# Patient Record
Sex: FEMALE | Race: WHITE | NOT HISPANIC OR LATINO | Employment: OTHER | URBAN - METROPOLITAN AREA
[De-identification: names, ages, dates, MRNs, and addresses within clinical notes are randomized per-mention and may not be internally consistent; named-entity substitution may affect disease eponyms.]

---

## 2021-02-26 ENCOUNTER — OFFICE VISIT (OUTPATIENT)
Dept: UROLOGY | Facility: CLINIC | Age: 56
End: 2021-02-26
Payer: COMMERCIAL

## 2021-02-26 VITALS
DIASTOLIC BLOOD PRESSURE: 80 MMHG | HEIGHT: 62 IN | WEIGHT: 149.94 LBS | SYSTOLIC BLOOD PRESSURE: 116 MMHG | BODY MASS INDEX: 27.59 KG/M2

## 2021-02-26 DIAGNOSIS — N39.3 STRESS INCONTINENCE: ICD-10-CM

## 2021-02-26 DIAGNOSIS — N39.0 RECURRENT UTI: Primary | ICD-10-CM

## 2021-02-26 LAB
BILIRUB SERPL-MCNC: NORMAL MG/DL
BLOOD URINE, POC: NORMAL
CLARITY, POC UA: CLEAR
COLOR, POC UA: YELLOW
GLUCOSE UR QL STRIP: NORMAL
KETONES UR QL STRIP: NORMAL
LEUKOCYTE ESTERASE URINE, POC: NORMAL
NITRITE, POC UA: NORMAL
PH, POC UA: 5
PROTEIN, POC: NORMAL
SPECIFIC GRAVITY, POC UA: 1.02
UROBILINOGEN, POC UA: NORMAL

## 2021-02-26 PROCEDURE — 99213 OFFICE O/P EST LOW 20 MIN: CPT | Mod: 25,S$GLB,, | Performed by: UROLOGY

## 2021-02-26 PROCEDURE — 81002 POCT URINE DIPSTICK WITHOUT MICROSCOPE: ICD-10-PCS | Mod: S$GLB,,, | Performed by: UROLOGY

## 2021-02-26 PROCEDURE — 1126F AMNT PAIN NOTED NONE PRSNT: CPT | Mod: S$GLB,,, | Performed by: UROLOGY

## 2021-02-26 PROCEDURE — 3008F PR BODY MASS INDEX (BMI) DOCUMENTED: ICD-10-PCS | Mod: CPTII,S$GLB,, | Performed by: UROLOGY

## 2021-02-26 PROCEDURE — 99213 PR OFFICE/OUTPT VISIT, EST, LEVL III, 20-29 MIN: ICD-10-PCS | Mod: 25,S$GLB,, | Performed by: UROLOGY

## 2021-02-26 PROCEDURE — 81002 URINALYSIS NONAUTO W/O SCOPE: CPT | Mod: S$GLB,,, | Performed by: UROLOGY

## 2021-02-26 PROCEDURE — 99999 PR PBB SHADOW E&M-NEW PATIENT-LVL III: CPT | Mod: PBBFAC,,, | Performed by: UROLOGY

## 2021-02-26 PROCEDURE — 1126F PR PAIN SEVERITY QUANTIFIED, NO PAIN PRESENT: ICD-10-PCS | Mod: S$GLB,,, | Performed by: UROLOGY

## 2021-02-26 PROCEDURE — 99999 PR PBB SHADOW E&M-NEW PATIENT-LVL III: ICD-10-PCS | Mod: PBBFAC,,, | Performed by: UROLOGY

## 2021-02-26 PROCEDURE — 3008F BODY MASS INDEX DOCD: CPT | Mod: CPTII,S$GLB,, | Performed by: UROLOGY

## 2021-02-26 RX ORDER — NITROFURANTOIN MACROCRYSTALS 50 MG/1
CAPSULE ORAL
Qty: 30 CAPSULE | Refills: 4 | Status: SHIPPED | OUTPATIENT
Start: 2021-02-26 | End: 2021-12-21

## 2021-02-26 RX ORDER — CLINDAMYCIN PHOSPHATE 10 UG/ML
LOTION TOPICAL
COMMUNITY
Start: 2020-07-09

## 2021-02-26 RX ORDER — SUMATRIPTAN SUCCINATE 100 MG/1
TABLET ORAL
COMMUNITY
Start: 2020-10-12

## 2021-02-26 RX ORDER — ESTRADIOL 0.07 MG/D
1 FILM, EXTENDED RELEASE TRANSDERMAL
COMMUNITY
End: 2022-01-06 | Stop reason: SDUPTHER

## 2021-02-26 RX ORDER — ACETAMINOPHEN 500 MG
1 TABLET ORAL
COMMUNITY

## 2021-02-26 RX ORDER — OLMESARTAN MEDOXOMIL 20 MG/1
TABLET ORAL
COMMUNITY

## 2021-02-26 RX ORDER — BUSPIRONE HYDROCHLORIDE 10 MG/1
10 TABLET ORAL
COMMUNITY
Start: 2020-07-27

## 2021-02-26 RX ORDER — MAGNESIUM 200 MG
TABLET ORAL
COMMUNITY

## 2021-02-26 RX ORDER — NITROFURANTOIN MACROCRYSTALS 50 MG/1
50 CAPSULE ORAL
COMMUNITY
End: 2021-02-26 | Stop reason: SDUPTHER

## 2021-02-26 RX ORDER — MULTIVITAMIN
1 TABLET ORAL
COMMUNITY

## 2022-02-10 ENCOUNTER — OFFICE VISIT (OUTPATIENT)
Dept: UROLOGY | Facility: CLINIC | Age: 57
End: 2022-02-10
Payer: COMMERCIAL

## 2022-02-10 VITALS
WEIGHT: 157.44 LBS | SYSTOLIC BLOOD PRESSURE: 110 MMHG | BODY MASS INDEX: 28.79 KG/M2 | DIASTOLIC BLOOD PRESSURE: 80 MMHG

## 2022-02-10 DIAGNOSIS — N39.0 RECURRENT UTI: Primary | ICD-10-CM

## 2022-02-10 DIAGNOSIS — N39.3 STRESS INCONTINENCE: ICD-10-CM

## 2022-02-10 DIAGNOSIS — N28.1 RENAL CYST: ICD-10-CM

## 2022-02-10 PROCEDURE — 3008F BODY MASS INDEX DOCD: CPT | Mod: CPTII,S$GLB,, | Performed by: UROLOGY

## 2022-02-10 PROCEDURE — 1159F PR MEDICATION LIST DOCUMENTED IN MEDICAL RECORD: ICD-10-PCS | Mod: CPTII,S$GLB,, | Performed by: UROLOGY

## 2022-02-10 PROCEDURE — 1159F MED LIST DOCD IN RCRD: CPT | Mod: CPTII,S$GLB,, | Performed by: UROLOGY

## 2022-02-10 PROCEDURE — 99213 PR OFFICE/OUTPT VISIT, EST, LEVL III, 20-29 MIN: ICD-10-PCS | Mod: S$GLB,,, | Performed by: UROLOGY

## 2022-02-10 PROCEDURE — 3008F PR BODY MASS INDEX (BMI) DOCUMENTED: ICD-10-PCS | Mod: CPTII,S$GLB,, | Performed by: UROLOGY

## 2022-02-10 PROCEDURE — 99999 PR PBB SHADOW E&M-EST. PATIENT-LVL III: CPT | Mod: PBBFAC,,, | Performed by: UROLOGY

## 2022-02-10 PROCEDURE — 99999 PR PBB SHADOW E&M-EST. PATIENT-LVL III: ICD-10-PCS | Mod: PBBFAC,,, | Performed by: UROLOGY

## 2022-02-10 PROCEDURE — 99213 OFFICE O/P EST LOW 20 MIN: CPT | Mod: S$GLB,,, | Performed by: UROLOGY

## 2022-02-10 PROCEDURE — 3079F PR MOST RECENT DIASTOLIC BLOOD PRESSURE 80-89 MM HG: ICD-10-PCS | Mod: CPTII,S$GLB,, | Performed by: UROLOGY

## 2022-02-10 PROCEDURE — 3074F SYST BP LT 130 MM HG: CPT | Mod: CPTII,S$GLB,, | Performed by: UROLOGY

## 2022-02-10 PROCEDURE — 3074F PR MOST RECENT SYSTOLIC BLOOD PRESSURE < 130 MM HG: ICD-10-PCS | Mod: CPTII,S$GLB,, | Performed by: UROLOGY

## 2022-02-10 PROCEDURE — 3079F DIAST BP 80-89 MM HG: CPT | Mod: CPTII,S$GLB,, | Performed by: UROLOGY

## 2022-02-10 RX ORDER — EZETIMIBE 10 MG/1
10 TABLET ORAL
COMMUNITY
Start: 2022-01-21 | End: 2023-01-09

## 2022-02-10 RX ORDER — NITROFURANTOIN MACROCRYSTALS 50 MG/1
CAPSULE ORAL
Qty: 30 CAPSULE | Refills: 11 | Status: SHIPPED | OUTPATIENT
Start: 2022-02-10 | End: 2023-02-08 | Stop reason: SDUPTHER

## 2022-02-10 NOTE — PROGRESS NOTES
Chief Complaint   Patient presents with    Other     1 yr f/u          History of Present Illness:   Sobeida Castanon is a 56 y.o. female here for follow up.     2/10/22-No recent UTIs as long as she takes macrobid. No gross hematuria. JC with cough/sneze, but it doesn't happen if she squeezes her legs. Hasn't been doing kegels.   2/26/21-She has a h/o recurrent UTI and renal cyst. She uses post-coital macrobid. Last year, her renal ultrasound was unremarkable and did not show any cyst.   She has recently developed worsening of JC with cough, sneeze, laughing. Wears a pad when she goes out only. No recent UTI. No dysuria or gross hematuria.         Review of Systems   Constitutional: Negative for chills and fever.   Cardiovascular: Negative for chest pain.   Gastrointestinal: Negative for abdominal pain.   Musculoskeletal: Negative for back pain.   All other systems reviewed and are negative.      Current Outpatient Medications   Medication Sig Dispense Refill    busPIRone (BUSPAR) 10 MG tablet Take 10 mg by mouth.      cholecalciferol, vitamin D3, (VITAMIN D3) 50 mcg (2,000 unit) Cap Take 1 capsule by mouth.      clindamycin (CLEOCIN T) 1 % lotion APPLY TO AFFECTED AREA(S) TWICE DAILY      denosumab (PROLIA SUBQ) Prolia Take No date recorded No form recorded No frequency recorded No route recorded No set duration recorded No set duration amount recorded suspended No dosage strength recorded No dosage strength units of measure recorded      estradioL (VIVELLE-DOT) 0.075 mg/24 hr Place 1 patch onto the skin twice a week. 8 patch 11    ezetimibe (ZETIA) 10 mg tablet Take 10 mg by mouth.      magnesium 200 mg Tab magnesium Take No date recorded No form recorded No frequency recorded No route recorded No set duration recorded No set duration amount recorded active No dosage strength recorded No dosage strength units of measure recorded      multivitamin (THERAGRAN) per tablet Take 1 tablet by mouth.       olmesartan (BENICAR) 20 MG tablet       sumatriptan (IMITREX) 100 MG tablet TAKE ONE TABLET BY MOUTH AT ONSET OF HEADACHE; MAY REPEAT ONE TABLET IN 2 HOURS IF NEEDED.      nitrofurantoin (MACRODANTIN) 50 MG capsule TAKE 1 BY MOUTH POST-COITAL 30 capsule 11     No current facility-administered medications for this visit.       Review of patient's allergies indicates:   Allergen Reactions    Bisphosphonates      epig pain    Levofloxacin Other (See Comments)     Other reaction(s): Numbness, Other (See Comments)  Numbness to one side, pt states she felt like she was having a stroke  Felt like was having a stroke  Numbness to one side, pt states she felt like she was having a stroke      Atorvastatin     Rosuvastatin      Cannot recall SE's       Past medical, family, and social history reviewed as documented in chart with pertinent positive medical, family, and social history detailed in HPI.    Physical Exam  Vitals:    02/10/22 0946   BP: 110/80       General: Well-developed, Well Nourished in No acute distress  HEENT: Normocephalic, Atraumatic, Extraocular Movements intact  Neck: Supple, trachea midline, no cervical or supraclavicular lymphadenopathy  Respirations: Even and unlabored  Back: Midline spine without tenderness, no CVA tenderness  Abdomen: Soft, Non-tender, non-distended, no hepatosplenomegaly, no rebound or guarding, no palpable masses  Extremities: Moves all equally, atraumatic, no clubbing, cyanosis or edema  Skin: warm and dry  Psych: normal affect  Neuro: Alert and oriented x 3, Cranial nerves II-XII grossly intact      Urinalysis  Negative for blood, LE, nitrite    Assesment:   1. Recurrent UTI  POCT URINE DIPSTICK WITHOUT MICROSCOPE   2. Renal cyst  US Retroperitoneal Complete (Kidney and   3. Stress incontinence           Plan:  Recurrent UTI  -     POCT URINE DIPSTICK WITHOUT MICROSCOPE    Renal cyst  -     US Retroperitoneal Complete (Kidney and; Future; Expected date:  02/10/2022    Stress incontinence    Other orders  -     nitrofurantoin (MACRODANTIN) 50 MG capsule; TAKE 1 BY MOUTH POST-COITAL  Dispense: 30 capsule; Refill: 11    kegels    Follow up in about 1 year (around 2/10/2023).

## 2022-02-15 ENCOUNTER — HOSPITAL ENCOUNTER (OUTPATIENT)
Dept: RADIOLOGY | Facility: HOSPITAL | Age: 57
Discharge: HOME | End: 2022-02-15
Attending: UROLOGY
Payer: COMMERCIAL

## 2022-02-15 DIAGNOSIS — N28.1 RENAL CYST: ICD-10-CM

## 2022-02-15 PROCEDURE — 76770 US EXAM ABDO BACK WALL COMP: CPT | Mod: 26,,, | Performed by: RADIOLOGY

## 2022-02-15 PROCEDURE — 76770 US RETROPERITONEAL COMPLETE: ICD-10-PCS | Mod: 26,,, | Performed by: RADIOLOGY

## 2022-02-15 PROCEDURE — 76770 US EXAM ABDO BACK WALL COMP: CPT | Mod: TC

## 2023-02-08 ENCOUNTER — OFFICE VISIT (OUTPATIENT)
Dept: UROLOGY | Facility: CLINIC | Age: 58
End: 2023-02-08
Payer: COMMERCIAL

## 2023-02-08 VITALS
WEIGHT: 155.44 LBS | DIASTOLIC BLOOD PRESSURE: 75 MMHG | SYSTOLIC BLOOD PRESSURE: 107 MMHG | BODY MASS INDEX: 28.43 KG/M2 | HEART RATE: 78 BPM

## 2023-02-08 DIAGNOSIS — N28.1 RENAL CYST: ICD-10-CM

## 2023-02-08 DIAGNOSIS — N39.0 RECURRENT UTI: Primary | ICD-10-CM

## 2023-02-08 DIAGNOSIS — N39.3 STRESS INCONTINENCE: ICD-10-CM

## 2023-02-08 PROCEDURE — 99999 PR PBB SHADOW E&M-EST. PATIENT-LVL IV: CPT | Mod: PBBFAC,,, | Performed by: UROLOGY

## 2023-02-08 PROCEDURE — 3074F SYST BP LT 130 MM HG: CPT | Mod: CPTII,S$GLB,, | Performed by: UROLOGY

## 2023-02-08 PROCEDURE — 3078F DIAST BP <80 MM HG: CPT | Mod: CPTII,S$GLB,, | Performed by: UROLOGY

## 2023-02-08 PROCEDURE — 3078F PR MOST RECENT DIASTOLIC BLOOD PRESSURE < 80 MM HG: ICD-10-PCS | Mod: CPTII,S$GLB,, | Performed by: UROLOGY

## 2023-02-08 PROCEDURE — 3008F PR BODY MASS INDEX (BMI) DOCUMENTED: ICD-10-PCS | Mod: CPTII,S$GLB,, | Performed by: UROLOGY

## 2023-02-08 PROCEDURE — 1159F PR MEDICATION LIST DOCUMENTED IN MEDICAL RECORD: ICD-10-PCS | Mod: CPTII,S$GLB,, | Performed by: UROLOGY

## 2023-02-08 PROCEDURE — 3074F PR MOST RECENT SYSTOLIC BLOOD PRESSURE < 130 MM HG: ICD-10-PCS | Mod: CPTII,S$GLB,, | Performed by: UROLOGY

## 2023-02-08 PROCEDURE — 99214 PR OFFICE/OUTPT VISIT, EST, LEVL IV, 30-39 MIN: ICD-10-PCS | Mod: S$GLB,,, | Performed by: UROLOGY

## 2023-02-08 PROCEDURE — 3008F BODY MASS INDEX DOCD: CPT | Mod: CPTII,S$GLB,, | Performed by: UROLOGY

## 2023-02-08 PROCEDURE — 1159F MED LIST DOCD IN RCRD: CPT | Mod: CPTII,S$GLB,, | Performed by: UROLOGY

## 2023-02-08 PROCEDURE — 99999 PR PBB SHADOW E&M-EST. PATIENT-LVL IV: ICD-10-PCS | Mod: PBBFAC,,, | Performed by: UROLOGY

## 2023-02-08 PROCEDURE — 99214 OFFICE O/P EST MOD 30 MIN: CPT | Mod: S$GLB,,, | Performed by: UROLOGY

## 2023-02-08 RX ORDER — NITROFURANTOIN MACROCRYSTALS 50 MG/1
CAPSULE ORAL
Qty: 30 CAPSULE | Refills: 11 | Status: SHIPPED | OUTPATIENT
Start: 2023-02-08 | End: 2024-02-09 | Stop reason: SDUPTHER

## 2023-02-08 NOTE — PROGRESS NOTES
Chief Complaint   Patient presents with    Follow-up         History of Present Illness:   Sobeida Castanon is a 57 y.o. female here for follow up.     2/8/23- on post-coital macrobid and it works well as long as she takes it. If she forgets, she develops dysuria the next day. JC is getting worse. Wears 1 pad per day if she goes out. Didn't do kegels.   2/10/22-No recent UTIs as long as she takes macrobid. No gross hematuria. JC with cough/sneze, but it doesn't happen if she squeezes her legs. Hasn't been doing kegels.   2/26/21-She has a h/o recurrent UTI and renal cyst. She uses post-coital macrobid. Last year, her renal ultrasound was unremarkable and did not show any cyst.   She has recently developed worsening of JC with cough, sneeze, laughing. Wears a pad when she goes out only. No recent UTI. No dysuria or gross hematuria.         Review of Systems   Constitutional:  Negative for chills and fever.   Cardiovascular:  Negative for chest pain.   Gastrointestinal:  Negative for abdominal pain.   Musculoskeletal:  Negative for back pain.   All other systems reviewed and are negative.    Current Outpatient Medications   Medication Sig Dispense Refill    buPROPion (WELLBUTRIN XL) 150 MG TB24 tablet Take 1 tablet by mouth once daily.      busPIRone (BUSPAR) 10 MG tablet Take 10 mg by mouth.      cholecalciferol, vitamin D3, (VITAMIN D3) 50 mcg (2,000 unit) Cap Take 1 capsule by mouth.      clindamycin (CLEOCIN T) 1 % lotion APPLY TO AFFECTED AREA(S) TWICE DAILY      estradioL (VIVELLE-DOT) 0.075 mg/24 hr Place 1 patch onto the skin twice a week. 24 patch 3    magnesium 200 mg Tab magnesium Take No date recorded No form recorded No frequency recorded No route recorded No set duration recorded No set duration amount recorded active No dosage strength recorded No dosage strength units of measure recorded      multivitamin (THERAGRAN) per tablet Take 1 tablet by mouth.      olmesartan (BENICAR) 20 MG tablet        sumatriptan (IMITREX) 100 MG tablet TAKE ONE TABLET BY MOUTH AT ONSET OF HEADACHE; MAY REPEAT ONE TABLET IN 2 HOURS IF NEEDED.      denosumab (PROLIA SUBQ) Prolia Take No date recorded No form recorded No frequency recorded No route recorded No set duration recorded No set duration amount recorded suspended No dosage strength recorded No dosage strength units of measure recorded      nitrofurantoin (MACRODANTIN) 50 MG capsule TAKE 1 BY MOUTH POST-COITAL 30 capsule 11     No current facility-administered medications for this visit.       Review of patient's allergies indicates:   Allergen Reactions    Bisphosphonates      epig pain    Levofloxacin Other (See Comments)     Other reaction(s): Numbness, Other (See Comments)  Numbness to one side, pt states she felt like she was having a stroke  Felt like was having a stroke  Numbness to one side, pt states she felt like she was having a stroke      Atorvastatin     Ezetimibe      HA two weeks    Rosuvastatin      Cannot recall SE's       Past medical, family, and social history reviewed as documented in chart with pertinent positive medical, family, and social history detailed in HPI.    Physical Exam  Vitals:    02/08/23 1047   BP: 107/75   Pulse: 78         General: Well-developed, Well Nourished in No acute distress  HEENT: Normocephalic, Atraumatic, Extraocular Movements intact  Neck: Supple, trachea midline, no cervical or supraclavicular lymphadenopathy  Respirations: Even and unlabored  Back: Midline spine without tenderness, no CVA tenderness  Abdomen: Soft, Non-tender, non-distended, no hepatosplenomegaly, no rebound or guarding, no palpable masses  Extremities: Moves all equally, atraumatic, no clubbing, cyanosis or edema  Skin: warm and dry  Psych: normal affect  Neuro: Alert and oriented x 3, Cranial nerves II-XII grossly intact      Urinalysis  Negative for blood, LE, nitrite    Assesment:   1. Recurrent UTI        2. Stress incontinence        3. Renal  cyst  US Retroperitoneal Complete              Plan:  Recurrent UTI    Stress incontinence    Renal cyst  -     US Retroperitoneal Complete; Future; Expected date: 02/08/2023    Other orders  -     nitrofurantoin (MACRODANTIN) 50 MG capsule; TAKE 1 BY MOUTH POST-COITAL  Dispense: 30 capsule; Refill: 11      Pt considering PFPT. Will let me know if she wants to pursue this. Also discussed sling surgery.     Follow up in about 1 year (around 2/8/2024).

## 2023-02-10 ENCOUNTER — HOSPITAL ENCOUNTER (OUTPATIENT)
Dept: RADIOLOGY | Facility: HOSPITAL | Age: 58
Discharge: HOME | End: 2023-02-10
Attending: UROLOGY
Payer: COMMERCIAL

## 2023-02-10 DIAGNOSIS — N28.1 RENAL CYST: ICD-10-CM

## 2023-02-10 PROCEDURE — 76770 US EXAM ABDO BACK WALL COMP: CPT | Mod: 26,,, | Performed by: RADIOLOGY

## 2023-02-10 PROCEDURE — 76770 US RETROPERITONEAL COMPLETE: ICD-10-PCS | Mod: 26,,, | Performed by: RADIOLOGY

## 2023-02-10 PROCEDURE — 76770 US EXAM ABDO BACK WALL COMP: CPT | Mod: TC,PN

## 2024-02-09 ENCOUNTER — OFFICE VISIT (OUTPATIENT)
Dept: UROLOGY | Facility: CLINIC | Age: 59
End: 2024-02-09
Payer: COMMERCIAL

## 2024-02-09 VITALS
RESPIRATION RATE: 18 BRPM | HEART RATE: 87 BPM | WEIGHT: 156.75 LBS | SYSTOLIC BLOOD PRESSURE: 127 MMHG | DIASTOLIC BLOOD PRESSURE: 84 MMHG | HEIGHT: 62 IN | BODY MASS INDEX: 28.84 KG/M2

## 2024-02-09 DIAGNOSIS — N39.0 RECURRENT UTI: Primary | ICD-10-CM

## 2024-02-09 DIAGNOSIS — N39.3 STRESS INCONTINENCE: ICD-10-CM

## 2024-02-09 LAB
BILIRUB UR QL STRIP: NEGATIVE
GLUCOSE UR QL STRIP: NEGATIVE
KETONES UR QL STRIP: NEGATIVE
LEUKOCYTE ESTERASE UR QL STRIP: NEGATIVE
PH, POC UA: 6.5
POC BLOOD, URINE: NEGATIVE
POC NITRATES, URINE: NEGATIVE
POC RESIDUAL URINE VOLUME: 77 ML (ref 0–100)
PROT UR QL STRIP: NEGATIVE
SP GR UR STRIP: 1.01 (ref 1–1.03)
UROBILINOGEN UR STRIP-ACNC: 0.2 (ref 0.1–1.1)

## 2024-02-09 PROCEDURE — 3079F DIAST BP 80-89 MM HG: CPT | Mod: CPTII,S$GLB,, | Performed by: UROLOGY

## 2024-02-09 PROCEDURE — 3074F SYST BP LT 130 MM HG: CPT | Mod: CPTII,S$GLB,, | Performed by: UROLOGY

## 2024-02-09 PROCEDURE — 81003 URINALYSIS AUTO W/O SCOPE: CPT | Mod: QW,S$GLB,, | Performed by: UROLOGY

## 2024-02-09 PROCEDURE — 99214 OFFICE O/P EST MOD 30 MIN: CPT | Mod: S$GLB,,, | Performed by: UROLOGY

## 2024-02-09 PROCEDURE — 99999 PR PBB SHADOW E&M-EST. PATIENT-LVL IV: CPT | Mod: PBBFAC,,, | Performed by: UROLOGY

## 2024-02-09 PROCEDURE — 3008F BODY MASS INDEX DOCD: CPT | Mod: CPTII,S$GLB,, | Performed by: UROLOGY

## 2024-02-09 PROCEDURE — 1159F MED LIST DOCD IN RCRD: CPT | Mod: CPTII,S$GLB,, | Performed by: UROLOGY

## 2024-02-09 PROCEDURE — 51798 US URINE CAPACITY MEASURE: CPT | Mod: S$GLB,,, | Performed by: UROLOGY

## 2024-02-09 RX ORDER — NITROFURANTOIN MACROCRYSTALS 50 MG/1
CAPSULE ORAL
Qty: 30 CAPSULE | Refills: 11 | Status: SHIPPED | OUTPATIENT
Start: 2024-02-09

## 2024-02-09 NOTE — PROGRESS NOTES
Chief Complaint   Patient presents with    Annual Exam         History of Present Illness:   Sobeida Castanon is a 58 y.o. female here for follow up.     2/9/24-Pt on post-coital macrobid. Works well. No dysuria or gross hematuria. No recent UTIs. She does have some JC. Does kegels when she thinks about it. No pads.   2/8/23- on post-coital macrobid and it works well as long as she takes it. If she forgets, she develops dysuria the next day. JC is getting worse. Wears 1 pad per day if she goes out. Didn't do kegels.   2/10/22-No recent UTIs as long as she takes macrobid. No gross hematuria. JC with cough/sneze, but it doesn't happen if she squeezes her legs. Hasn't been doing kegels.   2/26/21-She has a h/o recurrent UTI and renal cyst. She uses post-coital macrobid. Last year, her renal ultrasound was unremarkable and did not show any cyst.   She has recently developed worsening of JC with cough, sneeze, laughing. Wears a pad when she goes out only. No recent UTI. No dysuria or gross hematuria.         Review of Systems   Constitutional:  Negative for chills and fever.   Cardiovascular:  Negative for chest pain.   Gastrointestinal:  Negative for abdominal pain.   Musculoskeletal:  Negative for back pain.   All other systems reviewed and are negative.      Current Outpatient Medications   Medication Sig Dispense Refill    bempedoic acid 180 mg Tab Take 1 tablet by mouth every morning.      buPROPion (WELLBUTRIN XL) 150 MG TB24 tablet Take 1 tablet by mouth once daily.      busPIRone (BUSPAR) 10 MG tablet Take 10 mg by mouth.      cholecalciferol, vitamin D3, (VITAMIN D3) 50 mcg (2,000 unit) Cap Take 1 capsule by mouth.      clindamycin (CLEOCIN T) 1 % lotion APPLY TO AFFECTED AREA(S) TWICE DAILY      DRYSOL DAB-O-MATIC 20 % external solution       estradioL (VIVELLE-DOT) 0.075 mg/24 hr Place 1 patch onto the skin twice a week. 24 patch 3    magnesium 200 mg Tab magnesium Take No date recorded No form recorded  No frequency recorded No route recorded No set duration recorded No set duration amount recorded active No dosage strength recorded No dosage strength units of measure recorded      multivitamin (THERAGRAN) per tablet Take 1 tablet by mouth.      olmesartan (BENICAR) 20 MG tablet       pantoprazole (PROTONIX) 40 MG tablet 30 tablets.      sumatriptan (IMITREX) 100 MG tablet TAKE ONE TABLET BY MOUTH AT ONSET OF HEADACHE; MAY REPEAT ONE TABLET IN 2 HOURS IF NEEDED.      azithromycin (Z-ADELAIDA) 250 MG tablet Take 2 tablets by mouth on day 1; Take 1 tablet by mouth on days 2-5 6 tablet 0    denosumab (PROLIA SUBQ) Prolia Take No date recorded No form recorded No frequency recorded No route recorded No set duration recorded No set duration amount recorded suspended No dosage strength recorded No dosage strength units of measure recorded      nitrofurantoin (MACRODANTIN) 50 MG capsule TAKE 1 BY MOUTH POST-COITAL 30 capsule 11    promethazine (PHENERGAN) 25 MG tablet TAKE 1 TABLET BY MOUTH EVERY 4-6 HOURS AS NEEDED FOR NAUSEA      sucralfate (CARAFATE) 1 gram tablet Take 1 g by mouth 4 (four) times daily.       No current facility-administered medications for this visit.       Review of patient's allergies indicates:   Allergen Reactions    Bisphosphonates      epig pain    Levofloxacin Other (See Comments)     Other reaction(s): Numbness, Other (See Comments)  Numbness to one side, pt states she felt like she was having a stroke  Felt like was having a stroke  Numbness to one side, pt states she felt like she was having a stroke      Atorvastatin     Ezetimibe      HA two weeks    Rosuvastatin      Cannot recall SE's       Past medical, family, and social history reviewed as documented in chart with pertinent positive medical, family, and social history detailed in HPI.    Physical Exam  Vitals:    02/09/24 1059   BP: 127/84   Pulse: 87   Resp: 18           General: Well-developed, Well Nourished in No acute distress  HEENT:  Normocephalic, Atraumatic, Extraocular Movements intact  Neck: Supple, trachea midline, no cervical or supraclavicular lymphadenopathy  Respirations: Even and unlabored  Back: Midline spine without tenderness, no CVA tenderness  Abdomen: Soft, Non-tender, non-distended, no hepatosplenomegaly, no rebound or guarding, no palpable masses  Extremities: Moves all equally, atraumatic, no clubbing, cyanosis or edema  Skin: warm and dry  Psych: normal affect  Neuro: Alert and oriented x 3, Cranial nerves II-XII grossly intact      Urinalysis  Negative for blood, LE, nitrite    Assesment:   1. Recurrent UTI  POCT Urinalysis, Dipstick, Automated, W/O Scope    POCT Bladder Scan      2. Stress incontinence                  Plan:  Recurrent UTI  -     POCT Urinalysis, Dipstick, Automated, W/O Scope  -     POCT Bladder Scan    Stress incontinence    Other orders  -     nitrofurantoin (MACRODANTIN) 50 MG capsule; TAKE 1 BY MOUTH POST-COITAL  Dispense: 30 capsule; Refill: 11      Discussed kegels, pelvic floor PT, mid-urethral sling and urethral bulking injections. Pt to continue kegels.       Follow up in about 1 year (around 2/9/2025).

## 2025-01-24 ENCOUNTER — OFFICE VISIT (OUTPATIENT)
Dept: UROLOGY | Facility: CLINIC | Age: 60
End: 2025-01-24
Payer: COMMERCIAL

## 2025-01-24 ENCOUNTER — HOSPITAL ENCOUNTER (OUTPATIENT)
Dept: RADIOLOGY | Facility: HOSPITAL | Age: 60
Discharge: HOME | End: 2025-01-24
Attending: UROLOGY
Payer: COMMERCIAL

## 2025-01-24 VITALS
DIASTOLIC BLOOD PRESSURE: 73 MMHG | HEART RATE: 80 BPM | RESPIRATION RATE: 18 BRPM | SYSTOLIC BLOOD PRESSURE: 108 MMHG | BODY MASS INDEX: 28.56 KG/M2 | WEIGHT: 155.19 LBS | HEIGHT: 62 IN

## 2025-01-24 DIAGNOSIS — N39.3 STRESS INCONTINENCE: ICD-10-CM

## 2025-01-24 DIAGNOSIS — R10.32 LLQ PAIN: ICD-10-CM

## 2025-01-24 DIAGNOSIS — N39.0 RECURRENT UTI: Primary | ICD-10-CM

## 2025-01-24 DIAGNOSIS — R31.29 MICROHEMATURIA: ICD-10-CM

## 2025-01-24 LAB
AMM URATE CRY UR QL COMP ASSIST: ABNORMAL
BACTERIA #/AREA URNS AUTO: ABNORMAL /HPF
BILIRUB UR QL STRIP: NEGATIVE
GLUCOSE UR QL STRIP: NEGATIVE
KETONES UR QL STRIP: NEGATIVE
LEUKOCYTE ESTERASE UR QL STRIP: NEGATIVE
MICROSCOPIC COMMENT: ABNORMAL
PH, POC UA: 5.5
POC BLOOD, URINE: POSITIVE
POC NITRATES, URINE: NEGATIVE
POC RESIDUAL URINE VOLUME: 0 ML (ref 0–100)
PROT UR QL STRIP: NEGATIVE
RBC #/AREA URNS AUTO: 1 /HPF (ref 0–4)
SP GR UR STRIP: 1.02 (ref 1–1.03)
SQUAMOUS #/AREA URNS AUTO: 7 /HPF
UROBILINOGEN UR STRIP-ACNC: 0.2 (ref 0.1–1.1)
WBC #/AREA URNS AUTO: 3 /HPF (ref 0–5)

## 2025-01-24 PROCEDURE — 3074F SYST BP LT 130 MM HG: CPT | Mod: CPTII,S$GLB,, | Performed by: UROLOGY

## 2025-01-24 PROCEDURE — 81003 URINALYSIS AUTO W/O SCOPE: CPT | Mod: QW,S$GLB,, | Performed by: UROLOGY

## 2025-01-24 PROCEDURE — 76770 US EXAM ABDO BACK WALL COMP: CPT | Mod: 26,,, | Performed by: RADIOLOGY

## 2025-01-24 PROCEDURE — 1160F RVW MEDS BY RX/DR IN RCRD: CPT | Mod: CPTII,S$GLB,, | Performed by: UROLOGY

## 2025-01-24 PROCEDURE — 99214 OFFICE O/P EST MOD 30 MIN: CPT | Mod: S$GLB,,, | Performed by: UROLOGY

## 2025-01-24 PROCEDURE — 81001 URINALYSIS AUTO W/SCOPE: CPT | Performed by: UROLOGY

## 2025-01-24 PROCEDURE — 3078F DIAST BP <80 MM HG: CPT | Mod: CPTII,S$GLB,, | Performed by: UROLOGY

## 2025-01-24 PROCEDURE — 76770 US EXAM ABDO BACK WALL COMP: CPT | Mod: TC,PN

## 2025-01-24 PROCEDURE — 87086 URINE CULTURE/COLONY COUNT: CPT | Performed by: UROLOGY

## 2025-01-24 PROCEDURE — 3008F BODY MASS INDEX DOCD: CPT | Mod: CPTII,S$GLB,, | Performed by: UROLOGY

## 2025-01-24 PROCEDURE — 1159F MED LIST DOCD IN RCRD: CPT | Mod: CPTII,S$GLB,, | Performed by: UROLOGY

## 2025-01-24 PROCEDURE — 51798 US URINE CAPACITY MEASURE: CPT | Mod: S$GLB,,, | Performed by: UROLOGY

## 2025-01-24 PROCEDURE — 99999 PR PBB SHADOW E&M-EST. PATIENT-LVL V: CPT | Mod: PBBFAC,,, | Performed by: UROLOGY

## 2025-01-24 RX ORDER — NITROFURANTOIN MACROCRYSTALS 50 MG/1
CAPSULE ORAL
Qty: 30 CAPSULE | Refills: 11 | Status: SHIPPED | OUTPATIENT
Start: 2025-01-24

## 2025-01-24 NOTE — PROGRESS NOTES
Chief Complaint   Patient presents with    Annual Exam     Recurrent UTI         History of Present Illness:   Sobeida Castanon is a 59 y.o. female here for follow up.     1/24/25-has some JC, but doesn't want a procedure. She does wear a pad. Does well as long as she is taking post-coital macrobid. No dysuria. She has had some LLQ pain for several months now. Worse after BM and when she lays down.   2/9/24-Pt on post-coital macrobid. Works well. No dysuria or gross hematuria. No recent UTIs. She does have some JC. Does kegels when she thinks about it. No pads.   2/8/23- on post-coital macrobid and it works well as long as she takes it. If she forgets, she develops dysuria the next day. JC is getting worse. Wears 1 pad per day if she goes out. Didn't do kegels.   2/10/22-No recent UTIs as long as she takes macrobid. No gross hematuria. JC with cough/sneze, but it doesn't happen if she squeezes her legs. Hasn't been doing kegels.   2/26/21-She has a h/o recurrent UTI and renal cyst. She uses post-coital macrobid. Last year, her renal ultrasound was unremarkable and did not show any cyst.   She has recently developed worsening of JC with cough, sneeze, laughing. Wears a pad when she goes out only. No recent UTI. No dysuria or gross hematuria.         Review of Systems   Constitutional:  Negative for chills and fever.   Cardiovascular:  Negative for chest pain.   Gastrointestinal:  Negative for abdominal pain.   Musculoskeletal:  Negative for back pain.   All other systems reviewed and are negative.      Current Outpatient Medications   Medication Sig Dispense Refill    azelastine (ASTELIN) 137 mcg (0.1 %) nasal spray SPRAY 1 SPRAY INTO EACH NOSTRIL IN THE MORNING AND BEFORE BEDTIME AS DIRECTED      bempedoic acid 180 mg Tab Take 1 tablet by mouth every morning.      buPROPion (WELLBUTRIN XL) 150 MG TB24 tablet Take 1 tablet by mouth once daily.      busPIRone (BUSPAR) 10 MG tablet Take 10 mg by mouth.       cholecalciferol, vitamin D3, (VITAMIN D3) 50 mcg (2,000 unit) Cap Take 1 capsule by mouth.      clindamycin (CLEOCIN T) 1 % lotion APPLY TO AFFECTED AREA(S) TWICE DAILY      denosumab (PROLIA SUBQ)       DRYSOL DAB-O-MATIC 20 % external solution       estradioL (VIVELLE-DOT) 0.075 mg/24 hr Place 1 patch onto the skin twice a week. 24 patch 3    magnesium 200 mg Tab magnesium Take No date recorded No form recorded No frequency recorded No route recorded No set duration recorded No set duration amount recorded active No dosage strength recorded No dosage strength units of measure recorded      multivitamin (THERAGRAN) per tablet Take 1 tablet by mouth.      olmesartan (BENICAR) 20 MG tablet       olmesartan (BENICAR) 40 MG tablet Take 1 tablet by mouth once daily.      pantoprazole (PROTONIX) 40 MG tablet 30 tablets.      sumatriptan (IMITREX) 100 MG tablet TAKE ONE TABLET BY MOUTH AT ONSET OF HEADACHE; MAY REPEAT ONE TABLET IN 2 HOURS IF NEEDED.      nitrofurantoin (MACRODANTIN) 50 MG capsule TAKE 1 BY MOUTH POST-COITAL 30 capsule 11    sucralfate (CARAFATE) 1 gram tablet Take 1 g by mouth 4 (four) times daily.       No current facility-administered medications for this visit.       Review of patient's allergies indicates:   Allergen Reactions    Bisphosphonates      epig pain    Levofloxacin Other (See Comments)     Other reaction(s): Numbness, Other (See Comments)  Numbness to one side, pt states she felt like she was having a stroke  Felt like was having a stroke  Numbness to one side, pt states she felt like she was having a stroke      Statins-hmg-coa reductase inhibitors Other (See Comments)    Atorvastatin     Ezetimibe      HA two weeks    Rosuvastatin      Cannot recall SE's       Past medical, family, and social history reviewed as documented in chart with pertinent positive medical, family, and social history detailed in HPI.    Physical Exam  Vitals:    01/24/25 1127   BP: 108/73   Pulse: 80   Resp: 18            General: Well-developed, Well Nourished in No acute distress  HEENT: Normocephalic, Atraumatic, Extraocular Movements intact  Neck: Supple, trachea midline, no cervical or supraclavicular lymphadenopathy  Respirations: Even and unlabored  Back: Midline spine without tenderness, no CVA tenderness  Abdomen: Soft, LLQ tenderness to palpation, non-distended, no hepatosplenomegaly, no rebound or guarding, no palpable masses  Extremities: Moves all equally, atraumatic, no clubbing, cyanosis or edema  Skin: warm and dry  Psych: normal affect  Neuro: Alert and oriented x 3, Cranial nerves II-XII grossly intact      Urinalysis  Trace blood, otherwise negative    Assesment:   1. Recurrent UTI  POCT Urinalysis, Dipstick, Automated, W/O Scope    POCT Bladder Scan    Urine Culture High Risk      2. Stress incontinence        3. Microhematuria  Urinalysis Microscopic      4. LLQ pain  US Retroperitoneal Complete                Plan:  Recurrent UTI  -     POCT Urinalysis, Dipstick, Automated, W/O Scope  -     POCT Bladder Scan  -     Urine Culture High Risk    Stress incontinence    Microhematuria  -     Urinalysis Microscopic    LLQ pain  -     US Retroperitoneal Complete; Future; Expected date: 01/24/2025    Other orders  -     nitrofurantoin (MACRODANTIN) 50 MG capsule; TAKE 1 BY MOUTH POST-COITAL  Dispense: 30 capsule; Refill: 11          Follow up in about 1 year (around 1/24/2026).

## 2025-01-26 LAB — BACTERIA UR CULT: NO GROWTH
